# Patient Record
Sex: MALE | Race: WHITE | URBAN - METROPOLITAN AREA
[De-identification: names, ages, dates, MRNs, and addresses within clinical notes are randomized per-mention and may not be internally consistent; named-entity substitution may affect disease eponyms.]

---

## 2021-11-11 ENCOUNTER — OFFICE VISIT (OUTPATIENT)
Dept: URBAN - METROPOLITAN AREA CLINIC 51 | Facility: CLINIC | Age: 56
End: 2021-11-11
Payer: COMMERCIAL

## 2021-11-11 DIAGNOSIS — H33.001 UNSPECIFIED RETINAL DETACHMENT WITH RETINAL BREAK, RIGHT EYE: ICD-10-CM

## 2021-11-11 DIAGNOSIS — H04.123 TEAR FILM INSUFFICIENCY OF BILATERAL LACRIMAL GLANDS: ICD-10-CM

## 2021-11-11 DIAGNOSIS — H33.312 HORSESHOE TEAR OF RETINA WITHOUT DETACHMENT, LEFT EYE: ICD-10-CM

## 2021-11-11 DIAGNOSIS — H43.313 VITREOUS MEMBRANES AND STRANDS, BILATERAL: Primary | ICD-10-CM

## 2021-11-11 PROCEDURE — 92004 COMPRE OPH EXAM NEW PT 1/>: CPT | Performed by: OPTOMETRIST

## 2021-11-11 PROCEDURE — 92134 CPTRZ OPH DX IMG PST SGM RTA: CPT | Performed by: OPTOMETRIST

## 2021-11-11 ASSESSMENT — INTRAOCULAR PRESSURE
OD: 15
OS: 16

## 2021-11-11 NOTE — IMPRESSION/PLAN
Impression: Horseshoe tear of retina without detachment, left eye: H33.312. Plan: History of retinal tear OS x ~20+ years ago. Retina is attached 360 degrees. RTC ASAP if notice signs/ symptoms of RD.

## 2021-11-11 NOTE — IMPRESSION/PLAN
Impression: Age-related nuclear cataract, bilateral: H25.13. Plan: Cataracts are very mild and not visually significant. No treatment currently necessary. RTC if notice changes in vision. Monitor.

## 2021-11-11 NOTE — IMPRESSION/PLAN
Impression: Tear film insufficiency of bilateral lacrimal glands: H04.123. Plan: Recommend OTC AT's for comfort as needed when eyes feel dry, gritty, water, or irritated.

## 2021-11-11 NOTE — IMPRESSION/PLAN
Impression: Vitreous membranes and strands, bilateral: H43.313. Plan: Retina is attached 360 degrees with no signs of any retinal holes, tears, or detachments observed on today's dilated examination. Pt to RTC ASAP if increase in floaters, flashes, or curtain or veil taking away vision. Will recheck in 1 month, sooner PRN.

## 2021-11-11 NOTE — IMPRESSION/PLAN
Impression: Unspecified retinal detachment with retinal break, right eye: H33.001. Plan: History of RD OD x ~20+ years ago. Scleral buckle OD. Retina is attached 360 degrees. RTC ASAP if notice signs/symptoms of RD.

## 2021-12-22 ENCOUNTER — OFFICE VISIT (OUTPATIENT)
Dept: URBAN - METROPOLITAN AREA CLINIC 51 | Facility: CLINIC | Age: 56
End: 2021-12-22
Payer: COMMERCIAL

## 2021-12-22 DIAGNOSIS — H25.13 AGE-RELATED NUCLEAR CATARACT, BILATERAL: ICD-10-CM

## 2021-12-22 PROCEDURE — 92134 CPTRZ OPH DX IMG PST SGM RTA: CPT | Performed by: OPTOMETRIST

## 2021-12-22 PROCEDURE — 99214 OFFICE O/P EST MOD 30 MIN: CPT | Performed by: OPTOMETRIST

## 2021-12-22 ASSESSMENT — VISUAL ACUITY
OD: 20/25
OS: 20/20

## 2021-12-22 ASSESSMENT — INTRAOCULAR PRESSURE
OS: 13
OD: 13

## 2021-12-22 ASSESSMENT — KERATOMETRY
OD: 44.15
OS: 44.80

## 2021-12-22 NOTE — IMPRESSION/PLAN
Impression: Age-related nuclear cataract, bilateral: H25.13. Plan: Cataracts are mild and not visually significant. No treatment currently necessary. RTC if notice changes in vision. Monitor annually.

## 2021-12-22 NOTE — IMPRESSION/PLAN
Impression: Horseshoe tear of retina without detachment, left eye: H33.312. Plan: History of retinal tear OS x ~20+ years ago. Retina attached 360 degrees. RTC ASAP if notice signs/ symptoms of RD. Monitor.

## 2021-12-22 NOTE — IMPRESSION/PLAN
Impression: Unspecified retinal detachment with retinal break, right eye: H33.001. Plan: History of RD OD x ~20+ years ago. Scleral buckle OD. Retina attached 360 degrees. RTC ASAP if notice signs/symptoms of RD. Monitor.

## 2021-12-22 NOTE — IMPRESSION/PLAN
Impression: Tear film insufficiency of bilateral lacrimal glands: H04.123. Plan: Recommend AT's for comfort as needed when eyes feel dry, gritty, water, or irritated.

## 2021-12-22 NOTE — IMPRESSION/PLAN
Impression: Vitreous membranes and strands, bilateral: H43.313. Plan: Impending PVD with peripheral retinal hemorrhage. Operculate hole, near by scarring noted. small retinal hemorrhage adjacent. No retinal fluid. Recommend f/u with retina within ~1-2 weeks, sooner if symptoms worsen.

## 2022-01-04 ENCOUNTER — OFFICE VISIT (OUTPATIENT)
Dept: URBAN - METROPOLITAN AREA CLINIC 44 | Facility: CLINIC | Age: 57
End: 2022-01-04
Payer: COMMERCIAL

## 2022-01-04 DIAGNOSIS — H33.322 ROUND HOLE OF RETINA, LEFT EYE: Primary | ICD-10-CM

## 2022-01-04 PROCEDURE — 92134 CPTRZ OPH DX IMG PST SGM RTA: CPT | Performed by: OPHTHALMOLOGY

## 2022-01-04 PROCEDURE — 67145 PROPH RTA DTCHMNT PC: CPT | Performed by: OPHTHALMOLOGY

## 2022-01-04 ASSESSMENT — INTRAOCULAR PRESSURE
OS: 18
OD: 16